# Patient Record
Sex: MALE | URBAN - METROPOLITAN AREA
[De-identification: names, ages, dates, MRNs, and addresses within clinical notes are randomized per-mention and may not be internally consistent; named-entity substitution may affect disease eponyms.]

---

## 2021-10-26 ENCOUNTER — OFFICE VISIT (OUTPATIENT)
Dept: URBAN - METROPOLITAN AREA CLINIC 60 | Facility: CLINIC | Age: 76
End: 2021-10-26
Payer: MEDICARE

## 2021-10-26 DIAGNOSIS — H43.21 CRYSTALLINE DEPOSITS IN VITREOUS BODY, RIGHT EYE: ICD-10-CM

## 2021-10-26 DIAGNOSIS — H35.3121 NEXDTVE AGE-RELATED MCLR DEGN, LEFT EYE, EARLY DRY STAGE: ICD-10-CM

## 2021-10-26 DIAGNOSIS — H25.812 COMBINED FORMS OF AGE-RELATED CATARACT, LEFT EYE: ICD-10-CM

## 2021-10-26 DIAGNOSIS — H52.13 MYOPIA, BILATERAL: ICD-10-CM

## 2021-10-26 PROCEDURE — 92004 COMPRE OPH EXAM NEW PT 1/>: CPT | Performed by: OPTOMETRIST

## 2021-10-26 PROCEDURE — 92250 FUNDUS PHOTOGRAPHY W/I&R: CPT | Performed by: OPTOMETRIST

## 2021-10-26 ASSESSMENT — INTRAOCULAR PRESSURE
OD: 24
OS: 24

## 2021-10-26 ASSESSMENT — VISUAL ACUITY
OS: 20/25
OD: 20/25

## 2021-10-26 NOTE — IMPRESSION/PLAN
Impression: Crystalline deposits in vitreous body, right eye: H43.21. Plan: Patient educated on findings.

## 2021-10-26 NOTE — IMPRESSION/PLAN
Impression: Nexdtve age-related mclr degn, left eye, early dry stage: H35.3121. Plan: Patient was educated on today's findings and recommend yearly exams. Baseline photos done today to document any changes for future visits. Recommend patient take an AREDS-2 formula multiple vitamin daily. Macular degeneration and AREDS-2 handout was given to patient.

## 2022-02-24 ENCOUNTER — OFFICE VISIT (OUTPATIENT)
Dept: URBAN - METROPOLITAN AREA CLINIC 60 | Facility: CLINIC | Age: 77
End: 2022-02-24
Payer: MEDICARE

## 2022-02-24 DIAGNOSIS — H17.9 CORNEAL SCAR: Primary | ICD-10-CM

## 2022-02-24 DIAGNOSIS — H25.11 AGE-RELATED NUCLEAR CATARACT, RIGHT EYE: ICD-10-CM

## 2022-02-24 DIAGNOSIS — H35.3131 BILATERAL NONEXUDATIVE AGE-RELATED MACULAR DEGENERATION, EARLY DRY STAGE: ICD-10-CM

## 2022-02-24 PROCEDURE — 92134 CPTRZ OPH DX IMG PST SGM RTA: CPT | Performed by: OPTOMETRIST

## 2022-02-24 PROCEDURE — 92014 COMPRE OPH EXAM EST PT 1/>: CPT | Performed by: OPTOMETRIST

## 2022-02-24 ASSESSMENT — VISUAL ACUITY
OS: 20/40
OD: 20/50

## 2022-02-24 ASSESSMENT — INTRAOCULAR PRESSURE
OS: 24
OD: 24

## 2022-02-24 NOTE — IMPRESSION/PLAN
Impression: Bilateral nonexudative age-related macular degeneration, early dry stage: H35.3131. Plan: Patient was educated on today's findings and recommend yearly exams. Reviewed last photos taken. Baseline OCT(MAC) done today to document any changes for future visits, results reviewed with patient. Recommend patient take an AREDS-2 formula multiple vitamin daily. Macular degeneration and AREDS-2 handout was given to patient. Will re-evaluate in 6 months.

## 2022-02-24 NOTE — IMPRESSION/PLAN
Impression: Corneal scar: H17.9; OD Plan: Scar from foreign body. No foreign body seen on today's exam. Scar is not affecting vision (scar is nasal). Patient educated on findings.

## 2022-04-15 ENCOUNTER — OFFICE VISIT (OUTPATIENT)
Dept: URBAN - METROPOLITAN AREA CLINIC 60 | Facility: CLINIC | Age: 77
End: 2022-04-15

## 2022-04-15 DIAGNOSIS — H52.13 MYOPIA, BILATERAL: Primary | ICD-10-CM

## 2022-04-15 PROCEDURE — 92015 DETERMINE REFRACTIVE STATE: CPT | Performed by: OPTOMETRIST

## 2022-08-24 ENCOUNTER — OFFICE VISIT (OUTPATIENT)
Dept: URBAN - METROPOLITAN AREA CLINIC 60 | Facility: CLINIC | Age: 77
End: 2022-08-24
Payer: MEDICARE

## 2022-08-24 DIAGNOSIS — H17.9 CORNEAL SCAR: ICD-10-CM

## 2022-08-24 DIAGNOSIS — H40.053 OCULAR HYPERTENSION, BILATERAL: ICD-10-CM

## 2022-08-24 DIAGNOSIS — H35.3131 BILATERAL NONEXUDATIVE AGE-RELATED MACULAR DEGENERATION, EARLY DRY STAGE: ICD-10-CM

## 2022-08-24 DIAGNOSIS — H43.21 CRYSTALLINE DEPOSITS IN VITREOUS BODY, RIGHT EYE: ICD-10-CM

## 2022-08-24 DIAGNOSIS — H25.812 COMBINED FORMS OF AGE-RELATED CATARACT, LEFT EYE: ICD-10-CM

## 2022-08-24 DIAGNOSIS — H25.11 AGE-RELATED NUCLEAR CATARACT, RIGHT EYE: ICD-10-CM

## 2022-08-24 DIAGNOSIS — E11.9 TYPE 2 DIABETES MELLITUS W/O COMPLICATION: Primary | ICD-10-CM

## 2022-08-24 PROCEDURE — 92133 CPTRZD OPH DX IMG PST SGM ON: CPT | Performed by: OPTOMETRIST

## 2022-08-24 PROCEDURE — 92014 COMPRE OPH EXAM EST PT 1/>: CPT | Performed by: OPTOMETRIST

## 2022-08-24 RX ORDER — LATANOPROST 50 UG/ML
0.005 % SOLUTION OPHTHALMIC
Qty: 2.5 | Refills: 11 | Status: ACTIVE
Start: 2022-08-24

## 2022-08-24 ASSESSMENT — INTRAOCULAR PRESSURE
OD: 22
OS: 24

## 2022-08-24 ASSESSMENT — VISUAL ACUITY
OS: 20/20
OD: 20/25

## 2022-08-24 NOTE — IMPRESSION/PLAN
Impression: Ocular hypertension, bilateral: H40.053. *Start Latanoprost QHS OU (IOP 22-OD/ 24-OS)(08/2022)* Plan: Patient educated on findings. Baseline OCT(RNFL) done today to document any changes for future visits, results reviewed with patient. Due to abnormal testing OD and elevated IOP, will start patient on Latanoprost QHS OU, erx'd to patient's pharmacy. Return in 3 months for a IOP check, pachs and visual field.

## 2022-08-24 NOTE — IMPRESSION/PLAN
Impression: Type 2 diabetes mellitus w/o complication: Q13.9. Plan: No evidence of diabetic retinopathy or diabetic macular edema. Discussed ocular and systemic benefits of blood sugar control. Stressed importance of yearly diabetic eye exams. 

*New glasses rx given*

## 2022-08-24 NOTE — IMPRESSION/PLAN
Impression: Corneal scar: H17.9; OD Plan: Scar from foreign body. Scar is not affecting vision (scar is nasal).

## 2022-08-24 NOTE — IMPRESSION/PLAN
Impression: Bilateral nonexudative age-related macular degeneration, early dry stage: H35.3131. Plan: Patient was educated on today's findings and recommend yearly exams. Reviewed last testing done. Recommend patient take an AREDS-2 formula multiple vitamin daily. Macular degeneration and AREDS-2 handout was given to patient.

## 2022-11-30 ENCOUNTER — OFFICE VISIT (OUTPATIENT)
Dept: URBAN - METROPOLITAN AREA CLINIC 60 | Facility: CLINIC | Age: 77
End: 2022-11-30
Payer: MEDICARE

## 2022-11-30 DIAGNOSIS — H40.053 OCULAR HYPERTENSION, BILATERAL: Primary | ICD-10-CM

## 2022-11-30 PROCEDURE — 92083 EXTENDED VISUAL FIELD XM: CPT | Performed by: OPTOMETRIST

## 2022-11-30 PROCEDURE — 76514 ECHO EXAM OF EYE THICKNESS: CPT | Performed by: OPTOMETRIST

## 2022-11-30 PROCEDURE — 99213 OFFICE O/P EST LOW 20 MIN: CPT | Performed by: OPTOMETRIST

## 2022-11-30 RX ORDER — LATANOPROST 50 UG/ML
0.005 % SOLUTION OPHTHALMIC
Qty: 2.5 | Refills: 11 | Status: ACTIVE
Start: 2022-11-30

## 2022-11-30 ASSESSMENT — INTRAOCULAR PRESSURE
OS: 21
OD: 21

## 2022-11-30 NOTE — IMPRESSION/PLAN
Impression: Ocular hypertension, bilateral: H40.053. *Start Latanoprost QHS OU (IOP 22-OD/ 24-OS)(08/2022)* Plan: IOP is slightly lower OU. Patient educated on findings. Reviewed today's visual field and last testing done with patient. Pachymetry done today as well with thin CCT OU. Patient to continue with Latanoprost QHS OU. Return in 6 months for a complete exam and OCT(RNFL).

## 2023-05-24 ENCOUNTER — OFFICE VISIT (OUTPATIENT)
Dept: URBAN - METROPOLITAN AREA CLINIC 60 | Facility: CLINIC | Age: 78
End: 2023-05-24
Payer: MEDICARE

## 2023-05-24 DIAGNOSIS — H25.11 AGE-RELATED NUCLEAR CATARACT, RIGHT EYE: ICD-10-CM

## 2023-05-24 DIAGNOSIS — H40.053 OCULAR HYPERTENSION, BILATERAL: ICD-10-CM

## 2023-05-24 DIAGNOSIS — H35.3131 BILATERAL NONEXUDATIVE AGE-RELATED MACULAR DEGENERATION, EARLY DRY STAGE: ICD-10-CM

## 2023-05-24 DIAGNOSIS — H25.812 COMBINED FORMS OF AGE-RELATED CATARACT, LEFT EYE: ICD-10-CM

## 2023-05-24 DIAGNOSIS — E11.9 TYPE 2 DIABETES MELLITUS W/O COMPLICATION: Primary | ICD-10-CM

## 2023-05-24 PROCEDURE — 92133 CPTRZD OPH DX IMG PST SGM ON: CPT | Performed by: OPTOMETRIST

## 2023-05-24 PROCEDURE — 92014 COMPRE OPH EXAM EST PT 1/>: CPT | Performed by: OPTOMETRIST

## 2023-05-24 ASSESSMENT — INTRAOCULAR PRESSURE
OD: 20
OS: 16

## 2023-05-24 NOTE — IMPRESSION/PLAN
Impression: Type 2 diabetes mellitus w/o complication: B36.8. Plan: No evidence of diabetic retinopathy or diabetic macular edema. Discussed ocular and systemic benefits of blood sugar control. Stressed importance of yearly diabetic eye exams.

## 2023-05-24 NOTE — IMPRESSION/PLAN
Impression: Ocular hypertension, bilateral: H40.053. Plan: Discussed findings wtih patient. IOP stable. OCT(RNFL) done today to document any changes for future visits. Continue latanoprost QHS OU. Return for VF 24-2 and IOP check in 6 months.

## 2023-05-24 NOTE — IMPRESSION/PLAN
Impression: Bilateral nonexudative age-related macular degeneration, early dry stage: H35.3131. Plan: Patient was educated on today's findings and recommend yearly exams. Recommend patient take an AREDS-2 formula multiple vitamin daily.

## 2023-11-27 ENCOUNTER — OFFICE VISIT (OUTPATIENT)
Dept: URBAN - METROPOLITAN AREA CLINIC 60 | Facility: CLINIC | Age: 78
End: 2023-11-27
Payer: MEDICARE

## 2023-11-27 DIAGNOSIS — H40.053 OCULAR HYPERTENSION, BILATERAL: Primary | ICD-10-CM

## 2023-11-27 DIAGNOSIS — H16.223 KERATOCONJUNCTIVITIS SICCA, BILATERAL: ICD-10-CM

## 2023-11-27 PROCEDURE — 92012 INTRM OPH EXAM EST PATIENT: CPT | Performed by: OPTOMETRIST

## 2023-11-27 PROCEDURE — 92083 EXTENDED VISUAL FIELD XM: CPT | Performed by: OPTOMETRIST

## 2023-11-27 RX ORDER — PROPYLENE GLYCOL 0.06 MG/ML
0.6 % SOLUTION/ DROPS OPHTHALMIC
Qty: 0 | Refills: 0 | Status: ACTIVE
Start: 2023-11-27

## 2023-11-27 ASSESSMENT — INTRAOCULAR PRESSURE
OS: 25
OD: 26

## 2024-03-27 ENCOUNTER — OFFICE VISIT (OUTPATIENT)
Dept: URBAN - METROPOLITAN AREA CLINIC 60 | Facility: CLINIC | Age: 79
End: 2024-03-27
Payer: MEDICARE

## 2024-03-27 DIAGNOSIS — H25.813 COMBINED FORMS OF AGE-RELATED CATARACT, BILATERAL: ICD-10-CM

## 2024-03-27 DIAGNOSIS — E11.9 TYPE 2 DIABETES MELLITUS W/O COMPLICATION: Primary | ICD-10-CM

## 2024-03-27 DIAGNOSIS — H35.3131 BILATERAL NONEXUDATIVE AGE-RELATED MACULAR DEGENERATION, EARLY DRY STAGE: ICD-10-CM

## 2024-03-27 DIAGNOSIS — H43.21 CRYSTALLINE DEPOSITS IN VITREOUS BODY, RIGHT EYE: ICD-10-CM

## 2024-03-27 DIAGNOSIS — H40.053 OCULAR HYPERTENSION, BILATERAL: ICD-10-CM

## 2024-03-27 PROCEDURE — 92014 COMPRE OPH EXAM EST PT 1/>: CPT | Performed by: OPTOMETRIST

## 2024-03-27 PROCEDURE — 92133 CPTRZD OPH DX IMG PST SGM ON: CPT | Performed by: OPTOMETRIST

## 2024-03-27 ASSESSMENT — INTRAOCULAR PRESSURE
OS: 20
OD: 20

## 2024-03-27 ASSESSMENT — VISUAL ACUITY
OD: 20/30
OS: 20/20

## 2024-10-07 ENCOUNTER — OFFICE VISIT (OUTPATIENT)
Dept: URBAN - METROPOLITAN AREA CLINIC 60 | Facility: CLINIC | Age: 79
End: 2024-10-07
Payer: MEDICARE

## 2024-10-07 DIAGNOSIS — H40.1131 PRIMARY OPEN-ANGLE GLAUCOMA, MILD STAGE, BILATERAL: Primary | ICD-10-CM

## 2024-10-07 PROCEDURE — 92012 INTRM OPH EXAM EST PATIENT: CPT | Performed by: OPTOMETRIST

## 2024-10-07 PROCEDURE — 92083 EXTENDED VISUAL FIELD XM: CPT | Performed by: OPTOMETRIST

## 2024-10-07 ASSESSMENT — INTRAOCULAR PRESSURE
OD: 35
OS: 29

## 2024-10-21 ENCOUNTER — OFFICE VISIT (OUTPATIENT)
Dept: URBAN - METROPOLITAN AREA CLINIC 60 | Facility: CLINIC | Age: 79
End: 2024-10-21
Payer: MEDICARE

## 2024-10-21 DIAGNOSIS — T15.01XA FOREIGN BODY IN CORNEA, RIGHT EYE: ICD-10-CM

## 2024-10-21 DIAGNOSIS — H40.1131 PRIMARY OPEN-ANGLE GLAUCOMA, MILD STAGE, BILATERAL: Primary | ICD-10-CM

## 2024-10-21 PROCEDURE — 99213 OFFICE O/P EST LOW 20 MIN: CPT | Performed by: OPTOMETRIST

## 2024-10-21 PROCEDURE — 65222 REMOVE FOREIGN BODY FROM EYE: CPT | Performed by: OPTOMETRIST

## 2024-10-21 ASSESSMENT — INTRAOCULAR PRESSURE
OS: 20
OD: 22